# Patient Record
Sex: MALE | Race: WHITE
[De-identification: names, ages, dates, MRNs, and addresses within clinical notes are randomized per-mention and may not be internally consistent; named-entity substitution may affect disease eponyms.]

---

## 2022-07-19 ENCOUNTER — HOSPITAL ENCOUNTER (EMERGENCY)
Dept: HOSPITAL 75 - ER FS | Age: 27
Discharge: HOME | End: 2022-07-19
Payer: SELF-PAY

## 2022-07-19 VITALS — DIASTOLIC BLOOD PRESSURE: 81 MMHG | SYSTOLIC BLOOD PRESSURE: 140 MMHG

## 2022-07-19 DIAGNOSIS — Z28.310: ICD-10-CM

## 2022-07-19 DIAGNOSIS — U07.1: Primary | ICD-10-CM

## 2022-07-19 LAB
ALBUMIN SERPL-MCNC: 4.5 GM/DL (ref 3.2–4.5)
ALP SERPL-CCNC: 128 U/L (ref 40–136)
ALT SERPL-CCNC: 18 U/L (ref 0–55)
BARBITURATES UR QL: NEGATIVE
BASOPHILS # BLD AUTO: 0.1 10^3/UL (ref 0–0.1)
BASOPHILS NFR BLD AUTO: 1 % (ref 0–10)
BENZODIAZ UR QL SCN: NEGATIVE
BILIRUB SERPL-MCNC: 0.2 MG/DL (ref 0.1–1)
BUN/CREAT SERPL: 8
CALCIUM SERPL-MCNC: 9.2 MG/DL (ref 8.5–10.1)
CHLORIDE SERPL-SCNC: 105 MMOL/L (ref 98–107)
CO2 SERPL-SCNC: 28 MMOL/L (ref 21–32)
COCAINE UR QL: NEGATIVE
CREAT SERPL-MCNC: 0.87 MG/DL (ref 0.6–1.3)
EOSINOPHIL # BLD AUTO: 0.2 10^3/UL (ref 0–0.3)
EOSINOPHIL NFR BLD AUTO: 2 % (ref 0–10)
GFR SERPLBLD BASED ON 1.73 SQ M-ARVRAT: 122 ML/MIN
GLUCOSE SERPL-MCNC: 137 MG/DL (ref 70–105)
HCT VFR BLD CALC: 40 % (ref 40–54)
HGB BLD-MCNC: 13.7 G/DL (ref 13.3–17.7)
LYMPHOCYTES # BLD AUTO: 3.1 10^3/UL (ref 1–4)
LYMPHOCYTES NFR BLD AUTO: 42 % (ref 12–44)
MAGNESIUM SERPL-MCNC: 2 MG/DL (ref 1.6–2.4)
MANUAL DIFFERENTIAL PERFORMED BLD QL: NO
MCH RBC QN AUTO: 30 PG (ref 25–34)
MCHC RBC AUTO-ENTMCNC: 34 G/DL (ref 32–36)
MCV RBC AUTO: 88 FL (ref 80–99)
METHADONE UR QL SCN: NEGATIVE
MONOCYTES # BLD AUTO: 0.6 10^3/UL (ref 0–1)
MONOCYTES NFR BLD AUTO: 9 % (ref 0–12)
NEUTROPHILS # BLD AUTO: 3.3 10^3/UL (ref 1.8–7.8)
NEUTROPHILS NFR BLD AUTO: 46 % (ref 42–75)
OPIATES UR QL SCN: NEGATIVE
OXYCODONE UR QL: NEGATIVE
PLATELET # BLD: 177 10^3/UL (ref 130–400)
PMV BLD AUTO: 11.8 FL (ref 9–12.2)
POTASSIUM SERPL-SCNC: 3.9 MMOL/L (ref 3.6–5)
PROPOXYPH UR QL: NEGATIVE
PROT SERPL-MCNC: 7.5 GM/DL (ref 6.4–8.2)
SODIUM SERPL-SCNC: 141 MMOL/L (ref 135–145)
TRICYCLICS UR QL SCN: NEGATIVE
WBC # BLD AUTO: 7.2 10^3/UL (ref 4.3–11)

## 2022-07-19 PROCEDURE — 87636 SARSCOV2 & INF A&B AMP PRB: CPT

## 2022-07-19 PROCEDURE — 85025 COMPLETE CBC W/AUTO DIFF WBC: CPT

## 2022-07-19 PROCEDURE — 80306 DRUG TEST PRSMV INSTRMNT: CPT

## 2022-07-19 PROCEDURE — 84484 ASSAY OF TROPONIN QUANT: CPT

## 2022-07-19 PROCEDURE — 80053 COMPREHEN METABOLIC PANEL: CPT

## 2022-07-19 PROCEDURE — 85379 FIBRIN DEGRADATION QUANT: CPT

## 2022-07-19 PROCEDURE — 93005 ELECTROCARDIOGRAM TRACING: CPT

## 2022-07-19 PROCEDURE — 36415 COLL VENOUS BLD VENIPUNCTURE: CPT

## 2022-07-19 PROCEDURE — 83735 ASSAY OF MAGNESIUM: CPT

## 2022-07-19 PROCEDURE — 83880 ASSAY OF NATRIURETIC PEPTIDE: CPT

## 2022-07-19 PROCEDURE — 80320 DRUG SCREEN QUANTALCOHOLS: CPT

## 2022-07-19 PROCEDURE — 71045 X-RAY EXAM CHEST 1 VIEW: CPT

## 2022-07-19 PROCEDURE — 83605 ASSAY OF LACTIC ACID: CPT

## 2022-07-19 NOTE — ED GENERAL
General


Chief Complaint:  Chest Pain


Stated Complaint:  CHEST PAINS





History of Present Illness


Date Seen by Provider:  Jul 19, 2022


Time Seen by Provider:  05:14


Initial Comments


27-year-old male with no significant PMH is here with complaints of left-sided 

chest pain, tiredness, lethargy, and generalized weakness for the past 2 weeks. 

Patient denies fever, cough, shortness of breath, palpitations, abdominal pain, 

diarrhea, nausea and vomiting.  Patient states that he had some lightheadedness 

at work today morning.  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

is no air conditioning and it is very hot.  Patient states that he is drinking 

enough water and is able to eat all his meals without any issues.  No known 

history of contacts to COVID.





Allergies and Home Medications


Allergies


Coded Allergies:  


     No Known Drug Allergies (Unverified , 7/19/22)





Patient Home Medication List


Home Medication List Reviewed:  Yes





Review of Systems


Review of Systems


Constitutional:  dizziness, malaise, weakness


EENTM:  no symptoms reported


Respiratory:  no symptoms reported


Cardiovascular:  chest pain


Gastrointestinal:  no symptoms reported


Genitourinary:  no symptoms reported


Musculoskeletal:  no symptoms reported


Skin:  no symptoms reported


Psychiatric/Neurological:  No Symptoms Reported


Hematologic/Lymphatic:  No Symptoms Reported


Immunological/Allergic:  no symptoms reported





Past Medical-Social-Family Hx


Patient Social History


Tobacco Use?:  No


Use of E-Cig and/or Vaping dev:  No


Substance use?:  No


Alcohol Use?:  No


Pt feels they are or have been:  No





Physical Exam


Vital Signs





Vital Signs - First Documented








 7/19/22





 05:02


 


Temp 36.5


 


Pulse 109


 


Resp 18


 


B/P (MAP) 141/85 (103)


 


Pulse Ox 99


 


O2 Delivery Room Air





Capillary Refill :


Height, Weight, BMI


Height: '"


Weight: lbs. oz. kg;  BMI


Method:


General Appearance:  No Apparent Distress


HEENT:  PERRL/EOMI, Normal ENT Inspection


Neck:  Full Range of Motion, Normal Inspection, Non Tender, Supple


Respiratory:  Chest Non Tender, Lungs Clear, Normal Breath Sounds, No Accessory 

Muscle Use


Cardiovascular:  Regular Rate, Rhythm, No Edema, Tachycardia


Gastrointestinal:  Normal Bowel Sounds, Non Tender, Soft


Back:  Normal Inspection, No CVA Tenderness, No Vertebral Tenderness


Extremity:  Non Tender


Neurologic/Psychiatric:  Alert, Oriented x3, No Motor/Sensory Deficits, Normal 

Mood/Affect


Skin:  Normal Color


Lymphatic:  No Adenopathy





Focused Exam


Lactate Level


7/19/22 05:30: Lactic Acid Level 1.03





Lactic Acid Level





Laboratory Tests








Test


 7/19/22


05:30


 


Lactic Acid Level


 1.03 MMOL/L


(0.50-2.00)











Progress/Results/Core Measures


Suspected Sepsis


SIRS


Temperature: 


Pulse:  


Respiratory Rate: 


 


Laboratory Tests


7/19/22 05:30: White Blood Count 7.2


Blood Pressure  / 


Mean: 


 





7/19/22 05:30: Lactic Acid Level 1.03








Laboratory Tests


7/19/22 05:30: 


Creatinine 0.87, Platelet Count 177, Total Bilirubin 0.2





Results/Orders


Lab Results





Laboratory Tests








Test


 7/19/22


05:30 7/19/22


05:55 Range/Units


 


 


White Blood Count


 7.2 


 


 4.3-11.0


10^3/uL


 


Red Blood Count


 4.51 


 


 4.30-5.52


10^6/uL


 


Hemoglobin 13.7   13.3-17.7  g/dL


 


Hematocrit 40   40-54  %


 


Mean Corpuscular Volume 88   80-99  fL


 


Mean Corpuscular Hemoglobin 30   25-34  pg


 


Mean Corpuscular Hemoglobin


Concent 34 


 


 32-36  g/dL





 


Red Cell Distribution Width 12.4   10.0-14.5  %


 


Platelet Count


 177 


 


 130-400


10^3/uL


 


Mean Platelet Volume 11.8   9.0-12.2  fL


 


Immature Granulocyte % (Auto) 0    %


 


Neutrophils (%) (Auto) 46   42-75  %


 


Lymphocytes (%) (Auto) 42   12-44  %


 


Monocytes (%) (Auto) 9   0-12  %


 


Eosinophils (%) (Auto) 2   0-10  %


 


Basophils (%) (Auto) 1   0-10  %


 


Neutrophils # (Auto)


 3.3 


 


 1.8-7.8


10^3/uL


 


Lymphocytes # (Auto)


 3.1 


 


 1.0-4.0


10^3/uL


 


Monocytes # (Auto)


 0.6 


 


 0.0-1.0


10^3/uL


 


Eosinophils # (Auto)


 0.2 


 


 0.0-0.3


10^3/uL


 


Basophils # (Auto)


 0.1 


 


 0.0-0.1


10^3/uL


 


Immature Granulocyte # (Auto)


 0.0 


 


 0.0-0.1


10^3/uL


 


D-Dimer


 0.20 


 


 0.00-0.49


UG/ML


 


Sodium Level 141   135-145  MMOL/L


 


Potassium Level 3.9   3.6-5.0  MMOL/L


 


Chloride Level 105     MMOL/L


 


Carbon Dioxide Level 28   21-32  MMOL/L


 


Anion Gap 8   5-14  MMOL/L


 


Blood Urea Nitrogen 7   7-18  MG/DL


 


Creatinine


 0.87 


 


 0.60-1.30


MG/DL


 


Estimat Glomerular Filtration


Rate 122 


 


  





 


BUN/Creatinine Ratio 8    


 


Glucose Level 137 H    MG/DL


 


Lactic Acid Level


 1.03 


 


 0.50-2.00


MMOL/L


 


Calcium Level 9.2   8.5-10.1  MG/DL


 


Corrected Calcium 8.8   8.5-10.1  MG/DL


 


Magnesium Level 2.0   1.6-2.4  MG/DL


 


Total Bilirubin 0.2   0.1-1.0  MG/DL


 


Aspartate Amino Transf


(AST/SGOT) 17 


 


 5-34  U/L





 


Alanine Aminotransferase


(ALT/SGPT) 18 


 


 0-55  U/L





 


Alkaline Phosphatase 128     U/L


 


Troponin I < 0.30   <0.30  NG/ML


 


Pro-B-Type Natriuretic Peptide 13.7   <125.0  PG/ML


 


Total Protein 7.5   6.4-8.2  GM/DL


 


Albumin 4.5   3.2-4.5  GM/DL


 


Serum Alcohol < 10   <10  MG/DL


 


Influenza Type A (RT-PCR) Not Detected   Not Detecte  


 


Influenza Type B (RT-PCR) Not Detected   Not Detecte  


 


SARS-CoV-2 RNA (RT-PCR) Detected H  Not Detecte  


 


Urine Opiates Screen  NEGATIVE  NEGATIVE  


 


Urine Oxycodone Screen  NEGATIVE  NEGATIVE  


 


Urine Methadone Screen  NEGATIVE  NEGATIVE  


 


Urine Propoxyphene Screen  NEGATIVE  NEGATIVE  


 


Urine Barbiturates Screen  NEGATIVE  NEGATIVE  


 


Ur Tricyclic Antidepressants


Screen 


 NEGATIVE 


 NEGATIVE  





 


Urine Phencyclidine Screen  NEGATIVE  NEGATIVE  


 


Urine Amphetamines Screen  NEGATIVE  NEGATIVE  


 


Urine Methamphetamines Screen  NEGATIVE  NEGATIVE  


 


Urine Benzodiazepines Screen  NEGATIVE  NEGATIVE  


 


Urine Cocaine Screen  NEGATIVE  NEGATIVE  


 


Urine Cannabinoids Screen  NEGATIVE  NEGATIVE  








My Orders





Orders - DERRICK PARDO MD


Alcohol (7/19/22 05:22)


Cbc With Automated Diff (7/19/22 05:22)


Comprehensive Metabolic Panel (7/19/22 05:22)


Fibrin Degradation Products (7/19/22 05:22)


Drug Screen Stat (Urine) (7/19/22 05:22)


Lactic Acid Analyzer (7/19/22 05:22)


Magnesium (7/19/22 05:22)


Probnp Fs (7/19/22 05:22)


Troponin I Fs (7/19/22 05:22)


Chest 1 View Ap/Pa Only (7/19/22 05:22)


Covid 19 Inhouse Test (7/19/22 05:22)


Influenza A And B By Pcr (7/19/22 05:22)


Ed Iv/Invasive Line Start (7/19/22 05:24)


Ns Iv 1000 Ml (Sodium Chloride 0.9%) (7/19/22 05:30)


Famotidine Tablet (Pepcid Tablet) (7/19/22 05:30)





Medications Given in ED





Current Medications








 Medications  Dose


 Ordered  Sig/Louisa


 Route  Start Time


 Stop Time Status Last Admin


Dose Admin


 


 Famotidine  20 mg  ONCE  ONCE


 PO  7/19/22 05:30


 7/19/22 05:31 DC 7/19/22 05:36


20 MG








Vital Signs/I&O











 7/19/22





 05:02


 


Temp 36.5


 


Pulse 109


 


Resp 18


 


B/P (MAP) 141/85 (103)


 


Pulse Ox 99


 


O2 Delivery Room Air





Capillary Refill :


Progress Note :  


Progress Note


1. CHEST PAIN: COVID POSITIVE


- CXR


- TROPONIN/ EKG normal


- RAPID COVID EST: COVID positive


- CBC/CMP normal


- UA/UDS: Pt did not give urine sample


- Advised adequate hydration, quarantine, Vit C and zinc


-The patient was seen in the ED, and treated appropriately to presentation at a 

specific point in time. Patient is informed that there is a possibility that 

disease and illness can evolve and change in acuity rapidly or slowly after 

patient is discharged from the ER. Precautionary advice given to the patient for

immediate return to ER if symptoms worsen or do not resolve, and to seek 

emergency care sooner rather than later. Pt also advised on the importance of 

PCP follow up and compliance with management and follow up plan with PCP and/or 

specialist, as this is part of the management plan. Pt verbally expressed 

understanding.





Diagnostic Imaging





   Diagonstic Imaging:  Xray


   Plain Films/CT/US/NM/MRI:  chest





Departure


Impression





   Primary Impression:  


   Lab test positive for detection of COVID-19 virus


Disposition:  01 HOME, SELF-CARE


Condition:  Improved





Departure-Patient Inst.


Patient Instructions:  COVID-19 (DC)





Add. Discharge Instructions:  


- Advised adequate hydration, quarantine, Vit C and zinc


- Return to ER if symptoms worsen


- Follow up with PCP in 7 days





All discharge instructions reviewed with patient and/or family. Voiced 

understanding.


Work/School Note:  Work Release Form   Date Seen in the Emergency Department:  

Jul 19, 2022


      Restrictions:  Need Release from Doctor











DERRICK PARDO MD              Jul 19, 2022 05:15

## 2022-07-19 NOTE — DIAGNOSTIC IMAGING REPORT
EXAMINATION: Chest 1 view



HISTORY: Chest pain.



COMPARISON: None available.



FINDINGS: 



The lung volumes are normal. No focal consolidation is seen. No

large pleural effusion or pneumothorax is seen. The

cardiomediastinal silhouette is normal in size and contour. No

acute osseous abnormality is seen.



IMPRESSION: 



1. No acute pleuroparenchymal process. Please note, the periphery

of the lung bases is not included on this exam.



Dictated by: 



  Dictated on workstation # [id number]

## 2022-08-16 ENCOUNTER — HOSPITAL ENCOUNTER (EMERGENCY)
Dept: HOSPITAL 75 - ER FS | Age: 27
Discharge: HOME | End: 2022-08-16
Payer: SELF-PAY

## 2022-08-16 VITALS — BODY MASS INDEX: 25.56 KG/M2 | HEIGHT: 71.97 IN | WEIGHT: 188.72 LBS

## 2022-08-16 VITALS — SYSTOLIC BLOOD PRESSURE: 134 MMHG | DIASTOLIC BLOOD PRESSURE: 99 MMHG

## 2022-08-16 DIAGNOSIS — Z86.16: ICD-10-CM

## 2022-08-16 DIAGNOSIS — R42: ICD-10-CM

## 2022-08-16 DIAGNOSIS — F41.9: Primary | ICD-10-CM

## 2022-08-16 DIAGNOSIS — Z28.311: ICD-10-CM

## 2022-08-16 PROCEDURE — 82947 ASSAY GLUCOSE BLOOD QUANT: CPT

## 2022-08-16 NOTE — ED GENERAL
General


Chief Complaint:  General Problems/Pain


Stated Complaint:  NEAR SYNCOPE


Nursing Triage Note:  


Pt reports he became dizzy/lightheaded at work. Reports he has been seen in ED x




few weeks ago for similar symptoms. Pt appears anxious and moving hands 


frequently.


Source of Information:  Patient





History of Present Illness


Date Seen by Provider:  Aug 16, 2022


Time Seen by Provider:  20:00


Initial Comments


Patient is patient is a 27-year-old male with history of anxiety presents with 

dizziness and chest tightness and increased anxiety state while at work.  

Patient works as a .  States he drinks caffeinated beverages 

multiple times throughout the day.  Denies seizure disorder headache migraine, 

palpitations, shortness of breath nausea vomiting or sweats.  No history of 

diabetes or hypoglycemia.  Reports increased anxiety and generalized stress.  Is

not currently on medications.  He has been evaluated various doctors offices for

similar symptoms in the past 2 months.  History of COVID ago.


Timing/Duration:  1 Hour


Severity:  Moderate


Modifying Factors:  improves with Other


Associated Systoms:  Other





Allergies and Home Medications


Allergies


Coded Allergies:  


     No Known Drug Allergies (Unverified , 7/19/22)





Patient Home Medication List


Home Medication List Reviewed:  Yes





Review of Systems


Review of Systems


Constitutional:  see HPI


EENTM:  see HPI


Respiratory:  see HPI


Cardiovascular:  see HPI


Gastrointestinal:  see HPI


Musculoskeletal:  see HPI


Skin:  see HPI


Psychiatric/Neurological:  See HPI


Hematologic/Lymphatic:  See HPI


Immunological/Allergic:  see HPI





All Other Systems Reviewed


Negative Unless Noted:  No





Physical Exam


Vital Signs





Vital Signs - First Documented








 8/16/22





 20:10


 


Temp 36.7


 


Pulse 96


 


Resp 20


 


B/P (MAP) 154/99 (117)


 


Pulse Ox 100


 


O2 Delivery Room Air





Capillary Refill : Less Than 3 Seconds


Height, Weight, BMI


Height: '"


Weight: lbs. oz. kg; 25.00 BMI


Method:


General Appearance:  No Apparent Distress, WD/WN, Anxious


Eyes:  Bilateral Eye Normal Inspection, Bilateral Eye PERRL, Bilateral Eye EOMI


HEENT:  PERRL/EOMI


Neck:  Normal Inspection, Non Tender, Supple


Respiratory:  Lungs Clear


Cardiovascular:  Regular Rate, Rhythm


Neurologic/Psychiatric:  Alert, Oriented x3, No Motor/Sensory Deficits, Normal 

Mood/Affect





Focused Exam


Sepsis Stage:  Ruled Out





Progress/Results/Core Measures


Suspected Sepsis


SIRS


Temperature: 


Pulse: 96 


Respiratory Rate: 20


 


Blood Pressure 154 /99 


Mean: 117





Results/Orders


Lab Results





Laboratory Tests








Test


 8/16/22


20:19 Range/Units


 


 


Glucometer 113 H   MG/DL








My Orders





Orders - HATTIE WATTERS DO


Orthostatic Vital Signs (6-12y (8/16/22 20:14)


Accucheck Stat ONCE (8/16/22 20:15)


Lorazepam Tablet (Ativan Tablet) (8/16/22 20:15)





Vital Signs/I&O











 8/16/22





 20:10


 


Temp 36.7


 


Pulse 96


 


Resp 20


 


B/P (MAP) 154/99 (117)


 


Pulse Ox 100


 


O2 Delivery Room Air





Capillary Refill : Less Than 3 Seconds








Blood Pressure Mean:                    117











Departure


Communication (Admissions)


Orthostatic vital signs reviewed normal.  Blood sugar 113.  Patient continually 

rubbing his hands, mild hyperventilation and not heightened anxiety state.  

Denies drug or alcohol abuse no history of withdrawal.  Anxiety medications 

given with improvement.  Suspect anxiety is primary cause of patient's symptoms.

 Recommendations are for him to follow-up with PCP for further management of 

anxiety.





Impression





   Primary Impression:  


   Dizziness


   Additional Impression:  


   Anxiety


Disposition:  01 HOME, SELF-CARE


Condition:  Stable





Departure-Patient Inst.


Decision time for Depature:  20:32


Referrals:  


NO,LOCAL PHYSICIAN (PCP/Family)


Primary Care Physician


Patient Instructions:  Anxiety, Adult ED





Add. Discharge Instructions:  


You were evaluated in the emergency department for dizziness and increased 

anxiety.  Blood sugar is checked and is normal.  Your blood pressure was checked

in various and is normal.  Your symptoms are consistent with an increased 

anxiety to state.





All discharge instructions reviewed with patient and/or family. Voiced 

understanding.


Scripts


Lorazepam (Ativan) 1 Mg Tablet


1 MG SL Q8H for 7 Days, #10 TAB


   Prov: HATTIE WATTERS DO         8/16/22











HATTIE WATTERS DO                   Aug 16, 2022 20:33

## 2022-09-05 ENCOUNTER — HOSPITAL ENCOUNTER (EMERGENCY)
Dept: HOSPITAL 75 - ER FS | Age: 27
Discharge: HOME | End: 2022-09-05
Payer: SELF-PAY

## 2022-09-05 VITALS — BODY MASS INDEX: 24.99 KG/M2 | HEIGHT: 71.97 IN | WEIGHT: 184.53 LBS

## 2022-09-05 VITALS — DIASTOLIC BLOOD PRESSURE: 86 MMHG | SYSTOLIC BLOOD PRESSURE: 145 MMHG

## 2022-09-05 DIAGNOSIS — F41.0: Primary | ICD-10-CM

## 2022-09-05 PROCEDURE — 99281 EMR DPT VST MAYX REQ PHY/QHP: CPT

## 2022-09-05 NOTE — ED GENERAL
General


Chief Complaint:  Psych/Social Disorder


Stated Complaint:  ANXIETY


Source of Information:  Patient


Exam Limitations:  No Limitations





History of Present Illness


Date Seen by Provider:  Sep 5, 2022


Time Seen by Provider:  01:50


Initial Comments


Patient is a 27-year-old male with history of anxiety and panic who presents 

with acute anxiety attack starting 20 minutes prior to to arrival and sitting on

the computer.  Patient reports feeling anxiety, dread, dizziness lightheadedness

with tunneling of vision.  Reports dizziness and tingling in his hands and 

palpitations.  Patient denies alcohol and drug use.  He was seen in this 

emergency approximately 1 week ago for the same.  No other acute symptoms or 

complaints.


Timing/Duration:  1-3 Hours


Severity:  Moderate


Modifying Factors:  improves with Other


Associated Systoms:  Other





Allergies and Home Medications


Allergies


Coded Allergies:  


     No Known Drug Allergies (Unverified , 7/19/22)





Patient Home Medication List


Home Medication List Reviewed:  Yes


Lorazepam (Ativan) 1 Mg Tablet, 1 MG SL Q8H


   Prescribed by: HATTIE WATTERS on 8/16/22 2036





Review of Systems


Review of Systems


Constitutional:  see HPI


EENTM:  see HPI


Respiratory:  see HPI


Cardiovascular:  see HPI


Gastrointestinal:  see HPI


Genitourinary:  see HPI


Musculoskeletal:  see HPI


Skin:  see HPI


Hematologic/Lymphatic:  See HPI


Immunological/Allergic:  see HPI





All Other Systems Reviewed


Negative Unless Noted:  No





Past Medical-Social-Family Hx


Patient Social History


Tobacco Use?:  Yes





Immunizations Up To Date


First/Initial COVID19 Vaccinat:  Moderna


Second COVID19 Vaccination Zack:  Moderna





Physical Exam


Vital Signs





Vital Signs - First Documented








 9/5/22





 02:05


 


Temp 36.7


 


Pulse 89


 


Resp 18


 


B/P (MAP) 145/86 (105)


 


Pulse Ox 100


 


O2 Delivery Room Air





Capillary Refill :


Height, Weight, BMI


Height: '"


Weight: lbs. oz. kg; 25.00 BMI


Method:


General Appearance:  No Apparent Distress, WD/WN, Anxious


Eyes:  Bilateral Eye Normal Inspection, Bilateral Eye PERRL, Bilateral Eye EOMI


HEENT:  PERRL/EOMI, Normal ENT Inspection, Pharynx Normal


Neck:  Full Range of Motion, Normal Inspection, Non Tender


Respiratory:  Lungs Clear, Normal Breath Sounds


Cardiovascular:  No Edema, Tachycardia


Gastrointestinal:  Non Tender, Soft


Extremity:  Non Tender, No Calf Tenderness


Neurologic/Psychiatric:  Alert, Oriented x3, No Motor/Sensory Deficits, CNs II-

XII Norm as Tested





Focused Exam


Sepsis Stage:  Ruled Out





Progress/Results/Core Measures


Suspected Sepsis


SIRS


Temperature: 


Pulse:  


Respiratory Rate: 


 


Blood Pressure  / 


Mean:





Results/Orders


Vital Signs/I&O











 9/5/22





 02:05


 


Temp 36.7


 


Pulse 89


 


Resp 18


 


B/P (MAP) 145/86 (105)


 


Pulse Ox 100


 


O2 Delivery Room Air





Capillary Refill :





Departure


Communication (Admissions)


Acute anxiety episode.  Patient unable to find a ride home to allow for acute 

treatment in the emergency department.  Patient allowed to rest until symptoms 

fully resolved.. Patient has an appointment to follow-up with his therapist in 

town next week.  Recommendations are for monitoring and supportive care follow-

up as scheduled.  Return precautions reviewed.





Impression





   Primary Impression:  


   Anxiety attack


Disposition:  01 HOME, SELF-CARE


Condition:  Stable





Departure-Patient Inst.


Decision time for Depature:  02:13


Referrals:  


NO,LOCAL PHYSICIAN (PCP/Family)


Primary Care Physician


Patient Instructions:  Panic Attack ED





Add. Discharge Instructions:  


You were evaluated in the emergency department for acute panic/anxiety attack.  

Please go home and rest, take all medications and follow-up therapist next week 

as scheduled.  Return to the ED if new or worsening symptoms peer





All discharge instructions reviewed with patient and/or family. Voiced 

understanding.











HATTIE WATTERS DO                    Sep 5, 2022 02:15